# Patient Record
Sex: MALE | Race: BLACK OR AFRICAN AMERICAN | NOT HISPANIC OR LATINO | Employment: UNEMPLOYED | URBAN - METROPOLITAN AREA
[De-identification: names, ages, dates, MRNs, and addresses within clinical notes are randomized per-mention and may not be internally consistent; named-entity substitution may affect disease eponyms.]

---

## 2024-04-02 ENCOUNTER — HOSPITAL ENCOUNTER (EMERGENCY)
Facility: CLINIC | Age: 34
Discharge: HOME | End: 2024-04-02
Attending: STUDENT IN AN ORGANIZED HEALTH CARE EDUCATION/TRAINING PROGRAM | Admitting: STUDENT IN AN ORGANIZED HEALTH CARE EDUCATION/TRAINING PROGRAM
Payer: COMMERCIAL

## 2024-04-02 VITALS
SYSTOLIC BLOOD PRESSURE: 133 MMHG | WEIGHT: 160 LBS | HEIGHT: 77 IN | HEART RATE: 82 BPM | DIASTOLIC BLOOD PRESSURE: 86 MMHG | BODY MASS INDEX: 18.89 KG/M2 | RESPIRATION RATE: 16 BRPM | OXYGEN SATURATION: 99 % | TEMPERATURE: 99 F

## 2024-04-02 DIAGNOSIS — S61.210A LACERATION OF RIGHT INDEX FINGER WITHOUT FOREIGN BODY WITHOUT DAMAGE TO NAIL, INITIAL ENCOUNTER: ICD-10-CM

## 2024-04-02 PROCEDURE — 12001 RPR S/N/AX/GEN/TRNK 2.5CM/<: CPT | Performed by: STUDENT IN AN ORGANIZED HEALTH CARE EDUCATION/TRAINING PROGRAM

## 2024-04-02 PROCEDURE — 250N000009 HC RX 250: Performed by: PHYSICIAN ASSISTANT

## 2024-04-02 PROCEDURE — 99283 EMERGENCY DEPT VISIT LOW MDM: CPT | Performed by: STUDENT IN AN ORGANIZED HEALTH CARE EDUCATION/TRAINING PROGRAM

## 2024-04-02 PROCEDURE — 99283 EMERGENCY DEPT VISIT LOW MDM: CPT | Mod: 25 | Performed by: STUDENT IN AN ORGANIZED HEALTH CARE EDUCATION/TRAINING PROGRAM

## 2024-04-02 PROCEDURE — 250N000009 HC RX 250

## 2024-04-02 PROCEDURE — 12001 RPR S/N/AX/GEN/TRNK 2.5CM/<: CPT | Performed by: PHYSICIAN ASSISTANT

## 2024-04-02 RX ORDER — MAGNESIUM HYDROXIDE 1200 MG/15ML
LIQUID ORAL
Status: COMPLETED
Start: 2024-04-02 | End: 2024-04-02

## 2024-04-02 RX ORDER — LIDOCAINE HYDROCHLORIDE 10 MG/ML
10 INJECTION, SOLUTION INFILTRATION; PERINEURAL ONCE
Status: COMPLETED | OUTPATIENT
Start: 2024-04-02 | End: 2024-04-02

## 2024-04-02 RX ADMIN — SODIUM CHLORIDE: 900 IRRIGANT IRRIGATION at 18:29

## 2024-04-02 RX ADMIN — LIDOCAINE HYDROCHLORIDE 10 ML: 10 INJECTION, SOLUTION INFILTRATION; PERINEURAL at 18:28

## 2024-04-02 ASSESSMENT — COLUMBIA-SUICIDE SEVERITY RATING SCALE - C-SSRS
6. HAVE YOU EVER DONE ANYTHING, STARTED TO DO ANYTHING, OR PREPARED TO DO ANYTHING TO END YOUR LIFE?: NO
2. HAVE YOU ACTUALLY HAD ANY THOUGHTS OF KILLING YOURSELF IN THE PAST MONTH?: NO
1. IN THE PAST MONTH, HAVE YOU WISHED YOU WERE DEAD OR WISHED YOU COULD GO TO SLEEP AND NOT WAKE UP?: NO

## 2024-04-02 ASSESSMENT — ACTIVITIES OF DAILY LIVING (ADL)
ADLS_ACUITY_SCORE: 33
ADLS_ACUITY_SCORE: 33

## 2024-04-02 NOTE — ED PROVIDER NOTES
"ED Provider Note  [facility]      History     Chief Complaint   Patient presents with    Laceration     Accidentally cut R pointer \"box spring under my bed\"      HPI  Juan Tsai is a 33 year old male right-hand-dominant presents to the emergency department with female friend with concerns for right index finger laceration.  Patient notes several hours prior to arrival earlier today he was playing with some cats and a box spring at home and states that he accidentally put his hand into the box ring and cut his finger on a box spring.  He is adamant he was not bitten by the cat.  He sustained a laceration across the dorsal aspect of the index finger across the PIP.  He notes no numbness or tingling associated to the digit no other injury he denies other chronic medical problems.  He notes he does have prior tendon injury involving the DIP of the index finger and is unable to fully flex the joint at baseline.  He has not yet cleaned out the wound.  Per EMR last tetanus 2020.      Past Medical History  No past medical history on file.  No past surgical history on file.  No current outpatient medications on file.    Allergies   Allergen Reactions    Penicillins Rash     Family History  No family history on file.  Social History          A medically appropriate review of systems was performed with pertinent positives and negatives noted in the HPI, and all other systems negative.    Physical Exam   BP: 133/86  Pulse: 82  Temp: 99  F (37.2  C)  Resp: 16  Height: 195.6 cm (6' 5\")  Weight: 72.6 kg (160 lb)  SpO2: 99 %  Physical Exam      GENERAL APPEARANCE: The patient is well developed, well appearing, and in no acute distress.  HEAD:  Normocephalic and atraumatic.   EENT: Voice normal.  NECK: Trachea is midline.No lymphadenopathy or tenderness.  LUNGS: Breath sounds are equal and clear bilaterally. No wheezes, rhonchi, or rales.  HEART: Regular rate and normal rhythm.  Radial pulses 2+ " bilaterally.  ABDOMEN: Soft, flat, and benign. No mass, tenderness, guarding, or rebound.Bowel sounds are present.  EXTREMITIES: Patient has intact flexion extension of the PIP right index finger against resistance.  He has no tenderness to palpation over the phalanges of the right index finger.  NEUROLOGIC: No focal sensory or motor deficits are noted.  Gross sensation intact distal aspect right index finger.  Peripheral vascular: Cap refill less than 2 seconds right index finger.  PSYCHIATRIC: The patient is awake, alert.  Appropriate mood and affect.  SKIN: Exam of the right index finger shows linear laceration approxi-1 cm in length running across the PIP dorsal aspect right index finger.  There is some subcutaneous fat present, further evaluation shows no bone tendon or foreign body this was examined through full range of motion.      ED Course, Procedures, & Data      [facility]    -Laceration Repair    Date/Time: 4/2/2024 7:24 PM    Performed by: Lacey Mcdonald PA-C  Authorized by: Lacey Mcdonald PA-C    Risks, benefits and alternatives discussed.      ANESTHESIA (see MAR for exact dosages):     Anesthesia method:  Local infiltration    Local anesthetic:  Lidocaine 1% w/o epi  LACERATION DETAILS     Location:  Finger    Finger location:  R index finger    Length (cm):  1    REPAIR TYPE:     Repair type:  Simple    EXPLORATION:     Wound extent: no foreign body, no signs of injury, no tendon damage and no underlying fracture      Contaminated: no      TREATMENT:     Area cleansed with:  Saline    Amount of cleaning:  Standard    Irrigation solution:  Sterile saline    Irrigation method:  Pressure wash    Visualized foreign bodies/material removed: no      SKIN REPAIR     Repair method:  Sutures    Suture size:  4-0    Suture material:  Prolene    Suture technique:  Simple interrupted    Number of sutures:  3    APPROXIMATION     Approximation:   Close    POST-PROCEDURE DETAILS     Dressing:  Antibiotic ointment, non-adherent dressing and splint for protection      PROCEDURE    Patient Tolerance:  Patient tolerated the procedure well with no immediate complications                No results found for any visits on 04/02/24.  Medications   lidocaine 1 % injection 10 mL (10 mLs Intradermal $Given 4/2/24 3609)   sodium chloride 0.9% (bottle) 0.9 % irrigation (  $Given 4/2/24 8177)     Labs Ordered and Resulted from Time of ED Arrival to Time of ED Departure - No data to display  No orders to display          Critical care was not performed.     Medical Decision Making  The patient's presentation was of low complexity (an acute and uncomplicated illness or injury).    The patient's evaluation involved:  review of external note(s) from 1 sources (tetanus status)    The patient's management necessitated moderate risk (a decision regarding minor procedure (laceration repair) with risk factors of none).    Assessment & Plan    This is a 33 [place] patient with concerns for isolated laceration to the dorsal aspect of the right index finger.  On presentation vitals within normal limits patient has linear laceration across the PIP of the right index finger on further evaluation this is not deep into the joint this has no visible foreign body bone or tendon noted he has intact resisted flexion extension of the PIP against resistance and has intact cap refill and gross sensation to the distal aspect of the digit.  He does not have significant tenderness mechanism not concerning for fracture.  Discussed with him recommendations for digital block irrigation and closure.  He is agreeable.  I did perform digital block at bedside which obtain adequate anesthesia thereafter wound was copious irrigated by nursing staff with saline, and I was able to close up the cuts with 3 simple interrupted sutures.  We discussed returning primary care urgent care 10 days for suture removal,  wound care instructions, patient was given a splint to immobilize the joint until the wound can heal to prevent dehiscence.  We discussed findings for infection.  Tetanus is up-to-date.  No indication at this time for antibiotic prophylaxis.  Patient has no other questions or concerns at this time.  Red flag signs were addressed, and they were in agreement with the patient care plan provided.    Patient seen and discussed with attending physician , who agrees with my plan of care.      I have reviewed the nursing notes. I have reviewed the findings, diagnosis, plan and need for follow up with the patient.    New Prescriptions    No medications on file       Final diagnoses:   Laceration of right index finger without foreign body without damage to nail, initial encounter       JEFF Cruz  [facility] EMERGENCY DEPARTMENT  4/2/2024    --    ED Attending Physician Attestation    I Jany Castro MD, cared for this patient with the Advanced Practice Provider (JAZLYN). I have performed a history and physical examination of the patient independent of the JAZLYN. I reviewed the JAZLYN's documentation above and agree with the documented findings and plan of care. I personally provided a substantive portion of the care for this patient, including the complete Medical Decision Making. Please see the JAZLYN's documentation for full details.    Summary of HPI, PE, ED Course   Patient is a 33 year old male evaluated in the emergency department for laceration to his right index finger overlying the PIP.  Although poorly approximated, not particularly deep.  Normal flexion and extension of the joint, low suspicion of joint involvement.  Was cleaned very well.  Able to be sutured without any difficulty, and immobilized for discharge  Medical Decision Making  The patient's presentation was of low complexity (an acute and uncomplicated illness or injury).    The patient's evaluation involved:  review of external note(s)  from 3+ sources (see separate area of note for details)  review of 3+ test result(s) ordered prior to this encounter (see separate area of note for details)    The patient's management necessitated moderate risk (a decision regarding minor procedure (laceration repair) with risk factors of none).          Jany Castro MD  Emergency Medicine        Jany Castro MD  04/03/24 0117

## 2024-04-02 NOTE — ED TRIAGE NOTES
"  Accidentally cut R pointer \"box spring under my bed\"    Triage Assessment (Adult)       Row Name 04/02/24 7520          Triage Assessment    Airway WDL WDL        Respiratory WDL    Respiratory WDL WDL        Skin Circulation/Temperature WDL    Skin Circulation/Temperature WDL X        Cardiac WDL    Cardiac WDL WDL        Peripheral/Neurovascular WDL    Peripheral Neurovascular WDL WDL        Cognitive/Neuro/Behavioral WDL    Cognitive/Neuro/Behavioral WDL WDL                     "

## 2024-04-03 NOTE — DISCHARGE INSTRUCTIONS
Here in the emergency room we did closed up your cut with 3 stitches, these need to come out in 10 days he should follow-up with primary care urgent care for this.  We discussed recommendations of bacitracin ointment nonstick dressing for the first 24 hours at which point recommend bacitracin ointment over the cut.  You can shower for the first 24 hours we discussed not soaking the finger in pools hot tubs baths dishwater has a skin increase infection risk.  He can use Tylenol B Profen as needed for pain.  If you develop any findings for infection clear runny swelling pus fevers return back to the emergency department for further evaluation and management.

## 2025-02-13 ENCOUNTER — LAB (OUTPATIENT)
Dept: LAB | Facility: CLINIC | Age: 35
End: 2025-02-13
Payer: COMMERCIAL

## 2025-02-13 DIAGNOSIS — Z31.440 ENCOUNTER OF MALE FOR TESTING FOR GENETIC DISEASE CARRIER STATUS FOR PROCREATIVE MANAGEMENT: ICD-10-CM

## 2025-02-13 DIAGNOSIS — Z31.440 ENCOUNTER OF MALE FOR TESTING FOR GENETIC DISEASE CARRIER STATUS FOR PROCREATIVE MANAGEMENT: Primary | ICD-10-CM

## 2025-02-13 PROCEDURE — 36415 COLL VENOUS BLD VENIPUNCTURE: CPT

## 2025-02-13 NOTE — PROGRESS NOTES
[facility] [place]  Genetic Counseling Consult    Patient:  Juan Tsai YOB: 1990   Date of Service:  02/13/25      Juan accompanied his partner, Flores Payne to her appointment at the [facility] for genetic consultation. The option to pursue carrier screening was discussed today.        Impression/Plan:   1. Juan and his partner, Flores elected to pursue expanded carrier screening for 267 conditions through [employer]. Results are expected within 2-3 weeks, and will be available in [employer].  We will contact the couple to discuss the results. The couple requested that we contact Juan with results once both are available. He provided verbal permission for results to be left in her voicemail. Authorization to share protected health information was signed today's visit.     Carrier Screening:   Expanded carrier screening for mutations in a large panel of genes associated with autosomal recessive conditions including cystic fibrosis, spinal muscular atrophy, and others, is now available.  We discussed that expanded carrier screening is designed to identify carrier status for conditions that are primarily childhood or adolescent onset. Expanded carrier screening does not evaluate for adult-onset conditions such as hereditary cancer syndromes, dementia/ Alzheimer's disease, or cardiovascular disease risk factors. Additionally, expanded carrier screening is not comprehensive for all known genetic diseases or inherited conditions. This is a screening test, and residual carrier status risk figures will be provided to the patient after results become available. Carrier screening is not meant to diagnose the patient with a condition, and generally carriers are asymptomatic. However, certain genes may confer increased risks for various health concerns in carriers (DMD, FMR1, etc).  Patients are encouraged to share  "results with their primary care providers to ensure appropriate screening. If we are notified by the performing laboratory of a variant reclassification, the patient will be contacted.   We reviewed that there is a law in place, the Genetic Information Nondiscrimination Act (CELESTINE), that protects patients from discrimination by health insurance companies and employers based on their genetic information. CELESTINE does not protect against discrimination by life insurance companies or disability insurance.  We reviewed availability of expanded carrier screening through [employer] and [employer] and different panel sizes.  If an individual is a carrier, family members could be as well. The patient is encouraged to share positive results with siblings and other family members of reproductive age. Additionally, even if there is not a high reproductive risk for a condition, it is possible that carrier status can be passed on to future generations.  [facility] is not responsible for this billing, it is handled entirely by the performing lab. The patient has the responsibility of continuing with insurance billing or the option of changing to self pay. Many plans \"cover\" genetic testing but that does not mean free. Covered benefits go towards a deductible or out of pocket maximum (if a plan has these). If the patient decides the better financial option is to do self pay instead, it is their responsibility to communicate this to the performing lab. Genetic counselors have limited availability to change or help with this process. [employer] billing can also be reached at  [phone] or [email or web address]    Juan shared that he desires insurance billing.    We reviewed the benefits and limitations of this testing.  Screening tests provide a risk assessment specific to the pregnancy for certain fetal chromosome abnormalities, but cannot definitively diagnose or exclude a fetal chromosome abnormality.  Follow-up " genetic counseling and consideration of diagnostic testing is recommended with any abnormal screening result.     Diagnostic tests carry inherent risks- including risk of miscarriage- that require careful consideration.  These tests can detect fetal chromosome abnormalities with greater than 99% certainty.  Results can be compromised by maternal cell contamination or mosaicism, and are limited by the resolution of cytogenetic G-banding technology.  There is no screening nor diagnostic test that can detect all forms of birth defects or mental disability.    It was a pleasure to be involved with Juan's care.     Myranda Granger MS, [facility]  Licensed Genetic Counselor  [facility]  Pager: [phone]  Office: [phone]

## 2025-03-03 ENCOUNTER — TELEPHONE (OUTPATIENT)
Dept: MATERNAL FETAL MEDICINE | Facility: CLINIC | Age: 35
End: 2025-03-03
Payer: COMMERCIAL

## 2025-03-03 LAB — SCANNED LAB RESULT: ABNORMAL

## 2025-03-03 NOTE — TELEPHONE ENCOUNTER
March 3, 2025    I spoke with Flores regarding her NIPT and carrier screening results. I then called Juan, her partner, and spoke to him about results. Each person signed a consent to communicate.    NIPT:    Results indicate NO ANEUPLOIDY DETECTED for chromosomes 21, 18, 13, or the sex chromosomes. Fetal sex was not shared, per her request. Results also did NOT detect select microdeletions (1p36, 22q11.2, 5p, 4p, and 15q11.2)    This puts her current pregnancy at low risk for Down syndrome, trisomy 18, trisomy 13 and sex chromosome abnormalities. This test is reported to have the following sensitivities: Down syndrome- 99.7%, trisomy 18- 97.9%, and trisomy 13- 99.0%. Although these results are reassuring, this does not replace a standard chromosome analysis from a chorionic villus sampling or amniocentesis    MSAFP is the appropriate second trimester screening test for open neural tube defects; the maternal quad screen is not recommended.    Carrier screening:    I reviewed the results of her and her partner, Juan's, carrier screening for the [employer] comprehensive carrier screening which assessed 267 genes. The couple was not found to be carriers for any of the same conditions. Of note, Juan is at risk for reaction to certain medications or infections due to G6PD deficiency. Otherwise, they are not expected to have symptoms of the conditions they carry. They are encouraged to share results with family members for their own consideration of carrier screening. Flores was found to be a carrier for none of the conditions. 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    Propionic acidemia (PCCB c.990dupT(E331*)):  This is a condition in which the body cannot break down certain fats and proteins. Symptoms and age of onset can be variable.   The classic  form presents with poor feeding, hypotonia,  lethargy, and encephalopathy. If untreated, seizures, coma, and death can occur. For those individuals that survive the first metabolic crisis, long-term complications can include poor growth, cognitive delays, pancreatitis, seizures, and cardiomyopathy.   The more mild form of the condition presents later in live with intermittent symptoms triggered by illness.   Treatment includes dietary modification and with good management, individuals can have a good prognosis and normal lifespan.   Since Flores was NOT identified to be a carrier of this condition, the couple's residual reproductive risk is 1 in 22,300.     Alpha thalassemia (HBA1 deletion: aa/a-):  Alpha thalassemia is a blood condition in which the body does not produce enough hemoglobin alpha (alpha globin). Healthy individuals typically have four copies of hemoglobin alpha.   Individuals who have a deletion of one alpha globin copy are called silent carriers of alpha thalassemia.   Risks for the offspring of a silent carrier depend on the partner's alpha globin status. The most significant presentation would be if the partner has alpha thalassemia trait (only two copies of alpha globin) in cis. In this case, the couple would have a 25% chance to have a child with hemoglobin H disease. Individuals with this condition have significant anemia and hemolysis, and have specific medical management recommendations.     Glucose-6-phosphate dehydrogenase deficiency (G6PD c.202G>A(V68M)):  This condition affects how the body turns food into energy. It can cause hemolytic anemia. Jaundice is often the first symptoms of the condition.   This condition can be variable and has environmental triggers that exacerbate the symptoms (such as stella beans). Some affected individuals only have mild symptoms and others may be asymptomatic.   We discussed reaction may be due to taking anti-malarial drugs, common painkillers, and certain antibiotics. Contact with stella beans and  mothballs can also provoke attacks.  XX individuals with this condition can have mild hemolytic anemia.   There is a 50% chance that any of Juan's children will inherit this variant. The implications will depend on whether the fetus is XX or XY.   It was encouraged that Juan share this result with his primary provider, and discuss with his primary doctor prior to taking any of these medications.    Her results are available in her Epic chart for her primary OB to review.    Myranda Granger MS, [facility]  Licensed Genetic Counselor  [facility]  Pager: [phone]  Office: [phone]